# Patient Record
Sex: FEMALE | Race: ASIAN | NOT HISPANIC OR LATINO | Employment: UNEMPLOYED | URBAN - METROPOLITAN AREA
[De-identification: names, ages, dates, MRNs, and addresses within clinical notes are randomized per-mention and may not be internally consistent; named-entity substitution may affect disease eponyms.]

---

## 2024-05-22 ENCOUNTER — TRANSFERRED RECORDS (OUTPATIENT)
Dept: HEALTH INFORMATION MANAGEMENT | Facility: CLINIC | Age: 29
End: 2024-05-22

## 2024-05-22 ENCOUNTER — HOSPITAL ENCOUNTER (INPATIENT)
Facility: CLINIC | Age: 29
LOS: 3 days | Discharge: HOME HEALTH | End: 2024-05-25
Attending: OBSTETRICS & GYNECOLOGY | Admitting: OBSTETRICS & GYNECOLOGY
Payer: MEDICAID

## 2024-05-22 DIAGNOSIS — Z30.9 ENCOUNTER FOR CONTRACEPTIVE MANAGEMENT, UNSPECIFIED TYPE: ICD-10-CM

## 2024-05-22 DIAGNOSIS — O36.5990 FETAL GROWTH RESTRICTION ANTEPARTUM: Primary | ICD-10-CM

## 2024-05-22 LAB
ABO/RH(D): NORMAL
ANTIBODY SCREEN: NEGATIVE
BASOPHILS # BLD AUTO: 0 10E3/UL (ref 0–0.2)
BASOPHILS NFR BLD AUTO: 0 %
EOSINOPHIL # BLD AUTO: 0.2 10E3/UL (ref 0–0.7)
EOSINOPHIL NFR BLD AUTO: 4 %
ERYTHROCYTE [DISTWIDTH] IN BLOOD BY AUTOMATED COUNT: 13.9 % (ref 10–15)
ERYTHROCYTE [DISTWIDTH] IN BLOOD BY AUTOMATED COUNT: 14 % (ref 10–15)
HCT VFR BLD AUTO: 32.2 % (ref 35–47)
HCT VFR BLD AUTO: 32.3 % (ref 35–47)
HGB BLD-MCNC: 10.3 G/DL (ref 11.7–15.7)
HGB BLD-MCNC: 10.3 G/DL (ref 11.7–15.7)
HOLD SPECIMEN: NORMAL
IMM GRANULOCYTES # BLD: 0 10E3/UL
IMM GRANULOCYTES NFR BLD: 0 %
LYMPHOCYTES # BLD AUTO: 1.7 10E3/UL (ref 0.8–5.3)
LYMPHOCYTES NFR BLD AUTO: 26 %
MCH RBC QN AUTO: 26.5 PG (ref 26.5–33)
MCH RBC QN AUTO: 26.5 PG (ref 26.5–33)
MCHC RBC AUTO-ENTMCNC: 31.9 G/DL (ref 31.5–36.5)
MCHC RBC AUTO-ENTMCNC: 32 G/DL (ref 31.5–36.5)
MCV RBC AUTO: 83 FL (ref 78–100)
MCV RBC AUTO: 83 FL (ref 78–100)
MONOCYTES # BLD AUTO: 0.6 10E3/UL (ref 0–1.3)
MONOCYTES NFR BLD AUTO: 10 %
NEUTROPHILS # BLD AUTO: 4 10E3/UL (ref 1.6–8.3)
NEUTROPHILS NFR BLD AUTO: 60 %
NRBC # BLD AUTO: 0 10E3/UL
NRBC BLD AUTO-RTO: 0 /100
PLATELET # BLD AUTO: 223 10E3/UL (ref 150–450)
PLATELET # BLD AUTO: 223 10E3/UL (ref 150–450)
RBC # BLD AUTO: 3.88 10E6/UL (ref 3.8–5.2)
RBC # BLD AUTO: 3.88 10E6/UL (ref 3.8–5.2)
SPECIMEN EXPIRATION DATE: NORMAL
T PALLIDUM AB SER QL: NONREACTIVE
WBC # BLD AUTO: 6.7 10E3/UL (ref 4–11)
WBC # BLD AUTO: 7.1 10E3/UL (ref 4–11)

## 2024-05-22 PROCEDURE — 250N000009 HC RX 250: Performed by: STUDENT IN AN ORGANIZED HEALTH CARE EDUCATION/TRAINING PROGRAM

## 2024-05-22 PROCEDURE — 36415 COLL VENOUS BLD VENIPUNCTURE: CPT | Performed by: STUDENT IN AN ORGANIZED HEALTH CARE EDUCATION/TRAINING PROGRAM

## 2024-05-22 PROCEDURE — 86780 TREPONEMA PALLIDUM: CPT | Performed by: OBSTETRICS & GYNECOLOGY

## 2024-05-22 PROCEDURE — 85027 COMPLETE CBC AUTOMATED: CPT | Performed by: STUDENT IN AN ORGANIZED HEALTH CARE EDUCATION/TRAINING PROGRAM

## 2024-05-22 PROCEDURE — 258N000003 HC RX IP 258 OP 636: Performed by: STUDENT IN AN ORGANIZED HEALTH CARE EDUCATION/TRAINING PROGRAM

## 2024-05-22 PROCEDURE — 250N000013 HC RX MED GY IP 250 OP 250 PS 637: Performed by: STUDENT IN AN ORGANIZED HEALTH CARE EDUCATION/TRAINING PROGRAM

## 2024-05-22 PROCEDURE — 86780 TREPONEMA PALLIDUM: CPT | Performed by: STUDENT IN AN ORGANIZED HEALTH CARE EDUCATION/TRAINING PROGRAM

## 2024-05-22 PROCEDURE — 85048 AUTOMATED LEUKOCYTE COUNT: CPT | Performed by: OBSTETRICS & GYNECOLOGY

## 2024-05-22 PROCEDURE — 250N000013 HC RX MED GY IP 250 OP 250 PS 637

## 2024-05-22 PROCEDURE — 76815 OB US LIMITED FETUS(S): CPT | Mod: 26 | Performed by: OBSTETRICS & GYNECOLOGY

## 2024-05-22 PROCEDURE — 86900 BLOOD TYPING SEROLOGIC ABO: CPT | Performed by: OBSTETRICS & GYNECOLOGY

## 2024-05-22 PROCEDURE — 99222 1ST HOSP IP/OBS MODERATE 55: CPT | Mod: 25 | Performed by: OBSTETRICS & GYNECOLOGY

## 2024-05-22 PROCEDURE — 87653 STREP B DNA AMP PROBE: CPT | Performed by: OBSTETRICS & GYNECOLOGY

## 2024-05-22 PROCEDURE — 120N000002 HC R&B MED SURG/OB UMMC

## 2024-05-22 PROCEDURE — 59025 FETAL NON-STRESS TEST: CPT | Mod: 26 | Performed by: OBSTETRICS & GYNECOLOGY

## 2024-05-22 PROCEDURE — 3E0P7VZ INTRODUCTION OF HORMONE INTO FEMALE REPRODUCTIVE, VIA NATURAL OR ARTIFICIAL OPENING: ICD-10-PCS | Performed by: OBSTETRICS & GYNECOLOGY

## 2024-05-22 RX ORDER — TRANEXAMIC ACID 10 MG/ML
1 INJECTION, SOLUTION INTRAVENOUS EVERY 30 MIN PRN
Status: DISCONTINUED | OUTPATIENT
Start: 2024-05-22 | End: 2024-05-24 | Stop reason: HOSPADM

## 2024-05-22 RX ORDER — OXYTOCIN/0.9 % SODIUM CHLORIDE 30/500 ML
100-340 PLASTIC BAG, INJECTION (ML) INTRAVENOUS CONTINUOUS PRN
Status: DISCONTINUED | OUTPATIENT
Start: 2024-05-22 | End: 2024-05-25 | Stop reason: HOSPADM

## 2024-05-22 RX ORDER — MISOPROSTOL 200 UG/1
400 TABLET ORAL
Status: DISCONTINUED | OUTPATIENT
Start: 2024-05-22 | End: 2024-05-24 | Stop reason: HOSPADM

## 2024-05-22 RX ORDER — NALOXONE HYDROCHLORIDE 0.4 MG/ML
0.4 INJECTION, SOLUTION INTRAMUSCULAR; INTRAVENOUS; SUBCUTANEOUS
Status: DISCONTINUED | OUTPATIENT
Start: 2024-05-22 | End: 2024-05-24 | Stop reason: HOSPADM

## 2024-05-22 RX ORDER — LOPERAMIDE HCL 2 MG
2 CAPSULE ORAL
Status: DISCONTINUED | OUTPATIENT
Start: 2024-05-22 | End: 2024-05-24 | Stop reason: HOSPADM

## 2024-05-22 RX ORDER — ACETAMINOPHEN 325 MG/1
650 TABLET ORAL EVERY 4 HOURS PRN
Status: DISCONTINUED | OUTPATIENT
Start: 2024-05-22 | End: 2024-05-22 | Stop reason: HOSPADM

## 2024-05-22 RX ORDER — ONDANSETRON 2 MG/ML
4 INJECTION INTRAMUSCULAR; INTRAVENOUS EVERY 6 HOURS PRN
Status: DISCONTINUED | OUTPATIENT
Start: 2024-05-22 | End: 2024-05-22 | Stop reason: HOSPADM

## 2024-05-22 RX ORDER — LOPERAMIDE HCL 2 MG
4 CAPSULE ORAL
Status: DISCONTINUED | OUTPATIENT
Start: 2024-05-22 | End: 2024-05-24 | Stop reason: HOSPADM

## 2024-05-22 RX ORDER — KETOROLAC TROMETHAMINE 30 MG/ML
30 INJECTION, SOLUTION INTRAMUSCULAR; INTRAVENOUS
Status: DISCONTINUED | OUTPATIENT
Start: 2024-05-22 | End: 2024-05-25 | Stop reason: HOSPADM

## 2024-05-22 RX ORDER — OXYTOCIN 10 [USP'U]/ML
10 INJECTION, SOLUTION INTRAMUSCULAR; INTRAVENOUS
Status: DISCONTINUED | OUTPATIENT
Start: 2024-05-22 | End: 2024-05-24 | Stop reason: HOSPADM

## 2024-05-22 RX ORDER — OXYTOCIN/0.9 % SODIUM CHLORIDE 30/500 ML
1-24 PLASTIC BAG, INJECTION (ML) INTRAVENOUS CONTINUOUS
Status: DISCONTINUED | OUTPATIENT
Start: 2024-05-22 | End: 2024-05-23

## 2024-05-22 RX ORDER — METOCLOPRAMIDE 10 MG/1
10 TABLET ORAL EVERY 6 HOURS PRN
Status: DISCONTINUED | OUTPATIENT
Start: 2024-05-22 | End: 2024-05-24 | Stop reason: HOSPADM

## 2024-05-22 RX ORDER — PROCHLORPERAZINE 25 MG
25 SUPPOSITORY, RECTAL RECTAL EVERY 12 HOURS PRN
Status: DISCONTINUED | OUTPATIENT
Start: 2024-05-22 | End: 2024-05-24 | Stop reason: HOSPADM

## 2024-05-22 RX ORDER — CARBOPROST TROMETHAMINE 250 UG/ML
250 INJECTION, SOLUTION INTRAMUSCULAR
Status: DISCONTINUED | OUTPATIENT
Start: 2024-05-22 | End: 2024-05-24 | Stop reason: HOSPADM

## 2024-05-22 RX ORDER — METHYLERGONOVINE MALEATE 0.2 MG/ML
200 INJECTION INTRAVENOUS
Status: DISCONTINUED | OUTPATIENT
Start: 2024-05-22 | End: 2024-05-24 | Stop reason: HOSPADM

## 2024-05-22 RX ORDER — PROCHLORPERAZINE 25 MG
25 SUPPOSITORY, RECTAL RECTAL EVERY 12 HOURS PRN
Status: DISCONTINUED | OUTPATIENT
Start: 2024-05-22 | End: 2024-05-22 | Stop reason: HOSPADM

## 2024-05-22 RX ORDER — ONDANSETRON 4 MG/1
4 TABLET, ORALLY DISINTEGRATING ORAL EVERY 6 HOURS PRN
Status: DISCONTINUED | OUTPATIENT
Start: 2024-05-22 | End: 2024-05-24 | Stop reason: HOSPADM

## 2024-05-22 RX ORDER — MISOPROSTOL 100 UG/1
25 TABLET ORAL EVERY 4 HOURS PRN
Status: DISCONTINUED | OUTPATIENT
Start: 2024-05-22 | End: 2024-05-23

## 2024-05-22 RX ORDER — MISOPROSTOL 200 UG/1
800 TABLET ORAL
Status: DISCONTINUED | OUTPATIENT
Start: 2024-05-22 | End: 2024-05-24 | Stop reason: HOSPADM

## 2024-05-22 RX ORDER — ONDANSETRON 2 MG/ML
4 INJECTION INTRAMUSCULAR; INTRAVENOUS EVERY 6 HOURS PRN
Status: DISCONTINUED | OUTPATIENT
Start: 2024-05-22 | End: 2024-05-24 | Stop reason: HOSPADM

## 2024-05-22 RX ORDER — VITAMIN A ACETATE, BETA CAROTENE, ASCORBIC ACID, CHOLECALCIFEROL, .ALPHA.-TOCOPHEROL ACETATE, DL-, THIAMINE MONONITRATE, RIBOFLAVIN, NIACINAMIDE, PYRIDOXINE HYDROCHLORIDE, FOLIC ACID, CYANOCOBALAMIN, CALCIUM CARBONATE, FERROUS FUMARATE, ZINC OXIDE, CUPRIC OXIDE 3080; 12; 120; 400; 1; 1.84; 3; 20; 22; 920; 25; 200; 27; 10; 2 [IU]/1; UG/1; MG/1; [IU]/1; MG/1; MG/1; MG/1; MG/1; MG/1; [IU]/1; MG/1; MG/1; MG/1; MG/1; MG/1
1 TABLET, FILM COATED ORAL DAILY
COMMUNITY
Start: 2024-01-12

## 2024-05-22 RX ORDER — NALOXONE HYDROCHLORIDE 0.4 MG/ML
0.2 INJECTION, SOLUTION INTRAMUSCULAR; INTRAVENOUS; SUBCUTANEOUS
Status: DISCONTINUED | OUTPATIENT
Start: 2024-05-22 | End: 2024-05-24 | Stop reason: HOSPADM

## 2024-05-22 RX ORDER — METOCLOPRAMIDE HYDROCHLORIDE 5 MG/ML
10 INJECTION INTRAMUSCULAR; INTRAVENOUS EVERY 6 HOURS PRN
Status: DISCONTINUED | OUTPATIENT
Start: 2024-05-22 | End: 2024-05-24 | Stop reason: HOSPADM

## 2024-05-22 RX ORDER — PROCHLORPERAZINE MALEATE 10 MG
10 TABLET ORAL EVERY 6 HOURS PRN
Status: DISCONTINUED | OUTPATIENT
Start: 2024-05-22 | End: 2024-05-24 | Stop reason: HOSPADM

## 2024-05-22 RX ORDER — FAMOTIDINE 20 MG/1
20 TABLET, FILM COATED ORAL
COMMUNITY
Start: 2024-05-01

## 2024-05-22 RX ORDER — PENICILLIN G 3000000 [IU]/50ML
3 INJECTION, SOLUTION INTRAVENOUS EVERY 4 HOURS
Status: DISCONTINUED | OUTPATIENT
Start: 2024-05-22 | End: 2024-05-23

## 2024-05-22 RX ORDER — PROCHLORPERAZINE MALEATE 10 MG
10 TABLET ORAL EVERY 6 HOURS PRN
Status: DISCONTINUED | OUTPATIENT
Start: 2024-05-22 | End: 2024-05-22 | Stop reason: HOSPADM

## 2024-05-22 RX ORDER — METOCLOPRAMIDE 10 MG/1
10 TABLET ORAL EVERY 6 HOURS PRN
Status: DISCONTINUED | OUTPATIENT
Start: 2024-05-22 | End: 2024-05-22 | Stop reason: HOSPADM

## 2024-05-22 RX ORDER — OXYTOCIN 10 [USP'U]/ML
10 INJECTION, SOLUTION INTRAMUSCULAR; INTRAVENOUS
Status: DISCONTINUED | OUTPATIENT
Start: 2024-05-22 | End: 2024-05-25 | Stop reason: HOSPADM

## 2024-05-22 RX ORDER — SODIUM CHLORIDE, SODIUM LACTATE, POTASSIUM CHLORIDE, CALCIUM CHLORIDE 600; 310; 30; 20 MG/100ML; MG/100ML; MG/100ML; MG/100ML
INJECTION, SOLUTION INTRAVENOUS CONTINUOUS
Status: DISCONTINUED | OUTPATIENT
Start: 2024-05-22 | End: 2024-05-23

## 2024-05-22 RX ORDER — IBUPROFEN 800 MG/1
800 TABLET, FILM COATED ORAL
Status: DISCONTINUED | OUTPATIENT
Start: 2024-05-22 | End: 2024-05-25 | Stop reason: HOSPADM

## 2024-05-22 RX ORDER — FENTANYL CITRATE 50 UG/ML
100 INJECTION, SOLUTION INTRAMUSCULAR; INTRAVENOUS
Status: DISCONTINUED | OUTPATIENT
Start: 2024-05-22 | End: 2024-05-24 | Stop reason: HOSPADM

## 2024-05-22 RX ORDER — SODIUM CHLORIDE, SODIUM LACTATE, POTASSIUM CHLORIDE, CALCIUM CHLORIDE 600; 310; 30; 20 MG/100ML; MG/100ML; MG/100ML; MG/100ML
INJECTION, SOLUTION INTRAVENOUS CONTINUOUS
Status: DISCONTINUED | OUTPATIENT
Start: 2024-05-22 | End: 2024-05-24 | Stop reason: HOSPADM

## 2024-05-22 RX ORDER — FAMOTIDINE 20 MG/1
20 TABLET, FILM COATED ORAL DAILY
Status: DISCONTINUED | OUTPATIENT
Start: 2024-05-22 | End: 2024-05-25 | Stop reason: HOSPADM

## 2024-05-22 RX ORDER — PENICILLIN G POTASSIUM 5000000 [IU]/1
5 INJECTION, POWDER, FOR SOLUTION INTRAMUSCULAR; INTRAVENOUS ONCE
Status: COMPLETED | OUTPATIENT
Start: 2024-05-22 | End: 2024-05-22

## 2024-05-22 RX ORDER — LIDOCAINE 40 MG/G
CREAM TOPICAL
Status: DISCONTINUED | OUTPATIENT
Start: 2024-05-22 | End: 2024-05-22 | Stop reason: HOSPADM

## 2024-05-22 RX ORDER — CITRIC ACID/SODIUM CITRATE 334-500MG
30 SOLUTION, ORAL ORAL ONCE
Status: COMPLETED | OUTPATIENT
Start: 2024-05-22 | End: 2024-05-22

## 2024-05-22 RX ORDER — SODIUM CHLORIDE, SODIUM LACTATE, POTASSIUM CHLORIDE, CALCIUM CHLORIDE 600; 310; 30; 20 MG/100ML; MG/100ML; MG/100ML; MG/100ML
INJECTION, SOLUTION INTRAVENOUS CONTINUOUS
Status: DISCONTINUED | OUTPATIENT
Start: 2024-05-22 | End: 2024-05-22 | Stop reason: HOSPADM

## 2024-05-22 RX ORDER — OXYTOCIN/0.9 % SODIUM CHLORIDE 30/500 ML
340 PLASTIC BAG, INJECTION (ML) INTRAVENOUS CONTINUOUS PRN
Status: DISCONTINUED | OUTPATIENT
Start: 2024-05-22 | End: 2024-05-24 | Stop reason: HOSPADM

## 2024-05-22 RX ORDER — ONDANSETRON 4 MG/1
4 TABLET, ORALLY DISINTEGRATING ORAL EVERY 6 HOURS PRN
Status: DISCONTINUED | OUTPATIENT
Start: 2024-05-22 | End: 2024-05-22 | Stop reason: HOSPADM

## 2024-05-22 RX ORDER — CITRIC ACID/SODIUM CITRATE 334-500MG
30 SOLUTION, ORAL ORAL
Status: DISCONTINUED | OUTPATIENT
Start: 2024-05-22 | End: 2024-05-24 | Stop reason: HOSPADM

## 2024-05-22 RX ORDER — METOCLOPRAMIDE HYDROCHLORIDE 5 MG/ML
10 INJECTION INTRAMUSCULAR; INTRAVENOUS EVERY 6 HOURS PRN
Status: DISCONTINUED | OUTPATIENT
Start: 2024-05-22 | End: 2024-05-22 | Stop reason: HOSPADM

## 2024-05-22 RX ADMIN — FAMOTIDINE 20 MG: 20 TABLET ORAL at 18:10

## 2024-05-22 RX ADMIN — Medication 1 MILLI-UNITS/MIN: at 17:48

## 2024-05-22 RX ADMIN — SODIUM CHLORIDE, POTASSIUM CHLORIDE, SODIUM LACTATE AND CALCIUM CHLORIDE: 600; 310; 30; 20 INJECTION, SOLUTION INTRAVENOUS at 17:48

## 2024-05-22 RX ADMIN — MISOPROSTOL 25 MCG: 100 TABLET ORAL at 21:49

## 2024-05-22 ASSESSMENT — ACTIVITIES OF DAILY LIVING (ADL)
ADLS_ACUITY_SCORE: 35
ADLS_ACUITY_SCORE: 18

## 2024-05-22 NOTE — PLAN OF CARE
Goal Outcome Evaluation:     Assumed care of pt at 1515. Provider at bedside discussing plan for delivery. Plan to collect GBS and perform CST. CST started at 1755 pitocin stopped at 1900. Vag miso started at 2150. Report given to Jonathan TY RN

## 2024-05-22 NOTE — PLAN OF CARE
Data: Patient admitted to room 481 at 1350. Patient is a . Prenatal record reviewed.   OB History    Para Term  AB Living   2 1 1 0 0 1   SAB IAB Ectopic Multiple Live Births   0 0 0 0 1      # Outcome Date GA Lbr Bal/2nd Weight Sex Type Anes PTL Lv   2 Current            1 Term  39w6d   M    CORNELIUS   .  Medical History: History reviewed. No pertinent past medical history..  Gestational age 37w2d. Vital signs per doc flowsheet. Fetal movement present. Patient reports Induction Of Labor   as reason for admission. Support persons spouse present.  Action: RN obtained and assumed care of patient at admission. Verbal consent for EFM, external fetal monitors applied. Admission assessment completed. Patient and support persons educated on labor process. Patient instructed to report change in fetal movement, contractions, vaginal leaking of fluid or bleeding, abdominal pain, or any concerns related to the pregnancy to her nurse/physician. Patient oriented to room, call light in reach.   Response: Dr. Romero informed of patient's arrival. Plan per provider is to admit for delivery, mode pending.. Patient verbalized understanding of education and verbalized agreement with plan. Patient coping with labor via pending.  Hand off report given to Jacqueline Oliveira RN at 1098

## 2024-05-23 ENCOUNTER — OFFICE VISIT (OUTPATIENT)
Dept: INTERPRETER SERVICES | Facility: CLINIC | Age: 29
End: 2024-05-23
Payer: MEDICAID

## 2024-05-23 LAB
GP B STREP DNA SPEC QL NAA+PROBE: POSITIVE
T PALLIDUM AB SER QL: NONREACTIVE

## 2024-05-23 PROCEDURE — 59200 INSERT CERVICAL DILATOR: CPT | Mod: GC | Performed by: OBSTETRICS & GYNECOLOGY

## 2024-05-23 PROCEDURE — 59409 OBSTETRICAL CARE: CPT | Mod: GC | Performed by: OBSTETRICS & GYNECOLOGY

## 2024-05-23 PROCEDURE — 999N000157 HC STATISTIC RCP TIME EA 10 MIN

## 2024-05-23 PROCEDURE — 120N000002 HC R&B MED SURG/OB UMMC

## 2024-05-23 PROCEDURE — 250N000013 HC RX MED GY IP 250 OP 250 PS 637

## 2024-05-23 PROCEDURE — 250N000011 HC RX IP 250 OP 636

## 2024-05-23 PROCEDURE — 250N000009 HC RX 250: Performed by: STUDENT IN AN ORGANIZED HEALTH CARE EDUCATION/TRAINING PROGRAM

## 2024-05-23 PROCEDURE — 250N000009 HC RX 250

## 2024-05-23 PROCEDURE — 0HQ9XZZ REPAIR PERINEUM SKIN, EXTERNAL APPROACH: ICD-10-PCS | Performed by: OBSTETRICS & GYNECOLOGY

## 2024-05-23 PROCEDURE — 250N000013 HC RX MED GY IP 250 OP 250 PS 637: Performed by: STUDENT IN AN ORGANIZED HEALTH CARE EDUCATION/TRAINING PROGRAM

## 2024-05-23 PROCEDURE — T1013 SIGN LANG/ORAL INTERPRETER: HCPCS | Mod: U4

## 2024-05-23 PROCEDURE — 250N000011 HC RX IP 250 OP 636: Mod: JZ | Performed by: OBSTETRICS & GYNECOLOGY

## 2024-05-23 PROCEDURE — 10907ZC DRAINAGE OF AMNIOTIC FLUID, THERAPEUTIC FROM PRODUCTS OF CONCEPTION, VIA NATURAL OR ARTIFICIAL OPENING: ICD-10-PCS | Performed by: OBSTETRICS & GYNECOLOGY

## 2024-05-23 PROCEDURE — 250N000011 HC RX IP 250 OP 636: Performed by: STUDENT IN AN ORGANIZED HEALTH CARE EDUCATION/TRAINING PROGRAM

## 2024-05-23 PROCEDURE — 722N000001 HC LABOR CARE VAGINAL DELIVERY SINGLE

## 2024-05-23 PROCEDURE — 999N000016 HC STATISTIC ATTENDANCE AT DELIVERY

## 2024-05-23 RX ORDER — SODIUM CHLORIDE, SODIUM LACTATE, POTASSIUM CHLORIDE, CALCIUM CHLORIDE 600; 310; 30; 20 MG/100ML; MG/100ML; MG/100ML; MG/100ML
INJECTION, SOLUTION INTRAVENOUS CONTINUOUS PRN
Status: DISCONTINUED | OUTPATIENT
Start: 2024-05-23 | End: 2024-05-24 | Stop reason: HOSPADM

## 2024-05-23 RX ORDER — HYDROXYZINE HYDROCHLORIDE 50 MG/1
50 TABLET, FILM COATED ORAL EVERY 6 HOURS PRN
Status: DISCONTINUED | OUTPATIENT
Start: 2024-05-23 | End: 2024-05-25 | Stop reason: HOSPADM

## 2024-05-23 RX ORDER — PENICILLIN G 3000000 [IU]/50ML
3 INJECTION, SOLUTION INTRAVENOUS EVERY 4 HOURS
Status: DISCONTINUED | OUTPATIENT
Start: 2024-05-23 | End: 2024-05-24

## 2024-05-23 RX ORDER — PENICILLIN G POTASSIUM 5000000 [IU]/1
5 INJECTION, POWDER, FOR SOLUTION INTRAMUSCULAR; INTRAVENOUS ONCE
Status: COMPLETED | OUTPATIENT
Start: 2024-05-23 | End: 2024-05-23

## 2024-05-23 RX ORDER — ACETAMINOPHEN 325 MG/1
650 TABLET ORAL EVERY 6 HOURS PRN
Status: DISCONTINUED | OUTPATIENT
Start: 2024-05-23 | End: 2024-05-24

## 2024-05-23 RX ORDER — METHYLERGONOVINE MALEATE 0.2 MG/ML
INJECTION INTRAVENOUS
Status: DISCONTINUED
Start: 2024-05-23 | End: 2024-05-24 | Stop reason: WASHOUT

## 2024-05-23 RX ORDER — OXYTOCIN 10 [USP'U]/ML
INJECTION, SOLUTION INTRAMUSCULAR; INTRAVENOUS
Status: DISCONTINUED
Start: 2024-05-23 | End: 2024-05-24 | Stop reason: WASHOUT

## 2024-05-23 RX ORDER — MISOPROSTOL 200 UG/1
TABLET ORAL
Status: DISCONTINUED
Start: 2024-05-23 | End: 2024-05-24 | Stop reason: WASHOUT

## 2024-05-23 RX ORDER — LIDOCAINE HYDROCHLORIDE 10 MG/ML
INJECTION, SOLUTION EPIDURAL; INFILTRATION; INTRACAUDAL; PERINEURAL
Status: DISCONTINUED
Start: 2024-05-23 | End: 2024-05-24 | Stop reason: HOSPADM

## 2024-05-23 RX ORDER — OXYTOCIN/0.9 % SODIUM CHLORIDE 30/500 ML
1-24 PLASTIC BAG, INJECTION (ML) INTRAVENOUS CONTINUOUS
Status: DISCONTINUED | OUTPATIENT
Start: 2024-05-23 | End: 2024-05-24 | Stop reason: HOSPADM

## 2024-05-23 RX ORDER — LIDOCAINE 40 MG/G
CREAM TOPICAL
Status: DISCONTINUED | OUTPATIENT
Start: 2024-05-23 | End: 2024-05-24 | Stop reason: HOSPADM

## 2024-05-23 RX ORDER — HYDROXYZINE HYDROCHLORIDE 25 MG/1
25 TABLET, FILM COATED ORAL EVERY 6 HOURS PRN
Status: DISCONTINUED | OUTPATIENT
Start: 2024-05-23 | End: 2024-05-25 | Stop reason: HOSPADM

## 2024-05-23 RX ADMIN — ACETAMINOPHEN 650 MG: 325 TABLET, FILM COATED ORAL at 15:39

## 2024-05-23 RX ADMIN — FENTANYL CITRATE 100 MCG: 50 INJECTION INTRAMUSCULAR; INTRAVENOUS at 21:46

## 2024-05-23 RX ADMIN — LIDOCAINE HYDROCHLORIDE 20 ML: 10 INJECTION, SOLUTION EPIDURAL; INFILTRATION; INTRACAUDAL; PERINEURAL at 23:25

## 2024-05-23 RX ADMIN — Medication 2 MILLI-UNITS/MIN: at 20:08

## 2024-05-23 RX ADMIN — Medication 340 ML/HR: at 23:21

## 2024-05-23 RX ADMIN — MISOPROSTOL 25 MCG: 100 TABLET ORAL at 06:16

## 2024-05-23 RX ADMIN — PENICILLIN G POTASSIUM 5 MILLION UNITS: 5000000 POWDER, FOR SOLUTION INTRAMUSCULAR; INTRAPLEURAL; INTRATHECAL; INTRAVENOUS at 15:40

## 2024-05-23 RX ADMIN — PENICILLIN G 3 MILLION UNITS: 3000000 INJECTION, SOLUTION INTRAVENOUS at 19:46

## 2024-05-23 RX ADMIN — METOCLOPRAMIDE HYDROCHLORIDE 10 MG: 5 INJECTION INTRAMUSCULAR; INTRAVENOUS at 14:57

## 2024-05-23 RX ADMIN — FAMOTIDINE 20 MG: 20 TABLET ORAL at 08:05

## 2024-05-23 RX ADMIN — MISOPROSTOL 25 MCG: 100 TABLET ORAL at 01:56

## 2024-05-23 RX ADMIN — FENTANYL CITRATE 50 MCG: 50 INJECTION INTRAMUSCULAR; INTRAVENOUS at 10:20

## 2024-05-23 ASSESSMENT — ACTIVITIES OF DAILY LIVING (ADL)
ADLS_ACUITY_SCORE: 18
ADLS_ACUITY_SCORE: 22
ADLS_ACUITY_SCORE: 18
ADLS_ACUITY_SCORE: 22
ADLS_ACUITY_SCORE: 18
ADLS_ACUITY_SCORE: 18
ADLS_ACUITY_SCORE: 22
ADLS_ACUITY_SCORE: 18
ADLS_ACUITY_SCORE: 22
ADLS_ACUITY_SCORE: 18
ADLS_ACUITY_SCORE: 22
ADLS_ACUITY_SCORE: 18
ADLS_ACUITY_SCORE: 22

## 2024-05-23 NOTE — PROGRESS NOTES
"[facility]  Labor Progress Note    S:   Feels some tightening but not yet painful ctx. Ok with SVE and balloon placement.    O:   Patient Vitals for the past 24 hrs:   BP Temp Temp src Resp Height Weight   24 0555 101/54 98.5  F (36.9  C) Oral 16 -- --   24 0205 98/53 98.5  F (36.9  C) Oral 16 -- --   24 2320 111/71 98.1  F (36.7  C) Oral 16 1.549 m (5' 1\") 66.4 kg (146 lb 4.8 oz)   24 1903 111/65 98.5  F (36.9  C) Oral 16 -- --   24 1400 102/66 98.2  F (36.8  C) Oral 16 -- --       SVE: 1-/-2 (1016)> Wilburn w/ 80mL (1020) placed after 50 mcg IV fentanyl given  - exam chaperoned by bedside RN Berkley and OB staff Dr. Khan    FHT: 140 baseline, moderate variability, accels present, no decels  [place]: 5 ctx in 10 minutes        A/P:  Alli Gonzales is a 28 year old  at 37w3d by LMP with FGR, here for IOL in the setting of FGR d/t NR NST.    #Labor:  - SVE: wilburn in place for mechanical ripening  - Pain: Fentanyl in active labor  - GBS unknwon; PCN in active labor  - Plan: balloon in place; if ctx space while it is in, plan more miso; pit> AROM after balloon out prn    # Fetal Growth Restriction   # Non reassuring NST in clinic  - Harsha (): EFW 2578g (11%), AC (8%), UAD wnl   - Continuous monitoring    # Hypoechoic parenchymal area in the brain on outside ultrasound  - No evidence of intracranial lesion on BSUS today  - Recommend  head ultrasound   - Avoid operative vaginal delivery    #Fetal Well Being  - Category I FHT  - Continuous EFM  - Interventions PRN  - EFW 6##  - Vertex    Yessi Blas MD, PGY-3  10:40 AM  [facility] Obstetrics & Gynecology Residency    Appreciate Dr. Blas's note above, patient also seen and examined by me. I was present for placement of the wilburn balloon. I agree with the note above.   Irma Khan MD    "

## 2024-05-23 NOTE — H&P
Antepartum History and Physical   May 22, 2024  Alli Gonzales  [id number]    Interview performed with a phone       HPI: Alli Gonzales is a 28 year old  at 37w2d by LMP with history of FGR who presents to triage from clinic in the setting of nonreassuring fetal heart racing in clinic today.  The patient has a known history of a possible fetal intracerebral lesion as well as FGR and was undergoing a growth ultrasound today when a NST was performed and found to be nonreassuring with decelerations.  A BPP was documented to be 8 out of 10.  Umbilical artery Dopplers were normal at that time.  The recommendation was that the patient present to the hospital for induction of labor secondary to fetal growth restriction decelerations at term with possible intracranial lesion.    Alli states that she is feeling well today.  She denies any leakage of fluid, contractions, vaginal bleeding.  Endorses good fetal movement.  Denies any headaches, chest pain, shortness of breath, fevers, chills, nausea, vomiting, right upper quadrant pain, changes in vision, or worsening lower extremity edema.  She notes that during her previous regnancy she did not have any complications.  She denies any history of hypertension or gestational diabetes.  She denies any history of asthma.  She notes that her last delivery was uncomplicated without any postpartum hemorrhage or shoulder disorder concerns.    Her pregnancy has been complicated by:  - Fetal growth restriction   - Suspected intracranial lesion  - Prominent fetal bowel    OBHX:   OB History    Para Term  AB Living   2 1 1 0 0 1   SAB IAB Ectopic Multiple Live Births   0 0 0 0 1      # Outcome Date GA Lbr Bal/2nd Weight Sex Type Anes PTL Lv   2 Current            1 Term  39w6d   M    CORNELIUS     MedicalHX:   History reviewed. No pertinent past medical history.  Denies any chronic medical conditions    SurgicalHX:   History reviewed. No pertinent surgical history.  Denies  any past surgical history    Medications:   Current Facility-Administered Medications   Medication Dose Route Frequency Provider Last Rate Last Admin    carboprost (HEMABATE) injection 250 mcg  250 mcg Intramuscular Q15 Min PRN Shon Soctt MD        And    loperamide (IMODIUM) capsule 4 mg  4 mg Oral Once PRN Shon Scott MD        famotidine (PEPCID) tablet 20 mg  20 mg Oral Daily Shon Scott MD   20 mg at 05/22/24 1810    fentaNYL (PF) (SUBLIMAZE) injection 100 mcg  100 mcg Intravenous Q1H PRN Shon Scott MD        ketorolac (TORADOL) injection 30 mg  30 mg Intravenous Once PRN Shon Scott MD        Or    ketorolac (TORADOL) injection 30 mg  30 mg Intramuscular Once PRN Shon Scott MD        Or    ibuprofen (ADVIL/MOTRIN) tablet 800 mg  800 mg Oral Once PRN Shon Scott MD        lactated ringers BOLUS 1,000 mL  1,000 mL Intravenous Once PRN Shon Scott MD        Or    lactated ringers BOLUS 500 mL  500 mL Intravenous Once PRN Shon Scott MD        lactated ringers infusion   Intravenous Continuous Shon Scott MD   Held at 05/22/24 1806    lactated ringers infusion   Intravenous Continuous Shon Scott MD 10 mL/hr at 05/22/24 1748 New Bag at 05/22/24 1748    lidocaine 1 % 0.1-20 mL  0.1-20 mL Subcutaneous Once PRN Shon Scott MD        loperamide (IMODIUM) capsule 2 mg  2 mg Oral Q2H PRN Shon Scott MD        methylergonovine (METHERGINE) injection 200 mcg  200 mcg Intramuscular Q2H PRN Shon Scott MD        metoclopramide (REGLAN) injection 10 mg  10 mg Intravenous Q6H PRN Shon Scott MD        Or    metoclopramide (REGLAN) tablet 10 mg  10 mg Oral Q6H PRN Shon Scott MD        misoprostol (CYTOTEC) tablet 400 mcg  400 mcg Oral ONCE PRN REPEAT PER INSTRUCTIONS Shon Scott MD        Or    misoprostol (CYTOTEC) tablet 800 mcg  800 mcg Rectal ONCE PRN REPEAT PER INSTRUCTIONS Shon Scott MD        naloxone (NARCAN) injection 0.2 mg  0.2 mg Intravenous Q2 Min PRN  Shon Scott MD        Or    naloxone (NARCAN) injection 0.4 mg  0.4 mg Intravenous Q2 Min PRN Shon Scott MD        Or    naloxone (NARCAN) injection 0.2 mg  0.2 mg Intramuscular Q2 Min PRN Shon Scott MD        Or    naloxone (NARCAN) injection 0.4 mg  0.4 mg Intramuscular Q2 Min PRN Shon Scott MD        ondansetron (ZOFRAN ODT) ODT tab 4 mg  4 mg Oral Q6H PRN Shon Scott MD        Or    ondansetron (ZOFRAN) injection 4 mg  4 mg Intravenous Q6H PRN Shon Scott MD        oxytocin (PITOCIN) 30 units in 500 mL 0.9% NaCl infusion  100-340 mL/hr Intravenous Continuous PRN Shon Scott MD        oxytocin (PITOCIN) 30 units in 500 mL 0.9% NaCl infusion  340 mL/hr Intravenous Continuous PRN Shon Scott MD        oxytocin (PITOCIN) 30 units in 500 mL 0.9% NaCl infusion  1-24 miguel angel-units/min Intravenous Continuous Shon Scott MD 2 mL/hr at 05/22/24 1808 2 miguel angel-units/min at 05/22/24 1808    oxytocin (PITOCIN) injection 10 Units  10 Units Intramuscular Once PRN Shon Scott MD        oxytocin (PITOCIN) injection 10 Units  10 Units Intramuscular Once PRN Shon Scott MD        prochlorperazine (COMPAZINE) injection 10 mg  10 mg Intravenous Q6H PRN Shon Scott MD        Or    prochlorperazine (COMPAZINE) tablet 10 mg  10 mg Oral Q6H PRN Shon Scott MD        Or    prochlorperazine (COMPAZINE) suppository 25 mg  25 mg Rectal Q12H PRN Shon Scott MD        sodium citrate-citric acid (BICITRA) solution 30 mL  30 mL Oral Once PRN Shon Scott MD        tranexamic acid 1 g in 100 mL NS IV bag (premix)  1 g Intravenous Q30 Min PRN Shon Scott MD           Allergies:  No Known Allergies  Denies any allergies     FamilyHX:  History reviewed. No pertinent family history.    SocialHX:   Social History     Socioeconomic History    Marital status:      Spouse name: None    Number of children: None    Years of education: None    Highest education level: None   Tobacco Use    Smoking  status: Never    Smokeless tobacco: Never   Substance and Sexual Activity    Alcohol use: Not Currently    Drug use: Never    Sexual activity: Yes     Partners: Male     Social Determinants of Health     Financial Resource Strain: High Risk (1/25/2024)    Received from [facility]    Overall Financial Resource Strain (CARDIA)     Difficulty of Paying Living Expenses: Hard   Food Insecurity: Food Insecurity Present (1/25/2024)    Received from [facility]    Hunger Vital Sign     Worried About Running Out of Food in the Last Year: Sometimes true     Ran Out of Food in the Last Year: Sometimes true   Transportation Needs: Unmet Transportation Needs (1/25/2024)    Received from [facility]    PRAPARE - Transportation     Lack of Transportation (Medical): No     Lack of Transportation (Non-Medical): Yes   Physical Activity: Insufficiently Active (1/25/2024)    Received from [facility]    Exercise Vital Sign     Days of Exercise per Week: 2 days     Minutes of Exercise per Session: 30 min   Stress: No Stress Concern Present (1/25/2024)    Received from [facility]    Citizen of [place] of Occupational Health - Occupational Stress Questionnaire     Feeling of Stress : Only a little   Social Connections: Socially Isolated (1/25/2024)    Received from [facility]    Social Connection and Isolation Panel NHANES     Frequency of Communication with Friends and Family: Three times a week     Frequency of Social Gatherings with Friends and Family: Once a week     Attends Mu-ism Services: Never     Active Member of Clubs or Organizations: No     Attends Club or Organization Meetings: Never     Marital Status:    Interpersonal Safety: Not At Risk (1/25/2024)    Received from [facility]    Humiliation, Afraid, Rape, and Kick questionnaire     Fear of Current or Ex-Partner: No     Emotionally Abused: No     Physically Abused: No     Sexually Abused: No   Housing  "Stability: Low Risk  (1/25/2024)    Received from [facility]    Housing Stability     What is your housing situation today?: 3 - I have housing       ROS: 10-point ROS negative except as indicated in HPI.    Physical Exam:  Vitals:    05/22/24 1400   BP: 102/66   Resp: 16   Temp: 98.2  F (36.8  C)   TempSrc: Oral     General: alert, oriented female, resting in bed in NAD  CV: Regular rate and rhythm  Lungs: Breathing comfortably on room air, CTABL  Abdomen:  gravid, non tender, non distended, without rebound or guarding   Ext: Bilateral pedal edema    Presentation: cephalic by BSUS.  No evidence of intracranial lesion on bedside ultrasound    FHT: baseline 140 bpm, mod variability, accelerations, no decelerations  [place]: uterine irritability       Prenatal Labs:      Lab Results   Component Value Date    AS Negative 05/22/2024    HGB 10.3 (L) 05/22/2024       GBS Status:   No results found for: \"GBS\"    No results found for: \"PAP\"    Labs:   Results for orders placed or performed during the hospital encounter of 05/22/24 (from the past 24 hour(s))   CBC with platelets differential    Narrative    The following orders were created for panel order CBC with platelets differential.  Procedure                               Abnormality         Status                     ---------                               -----------         ------                     CBC with platelets and d...[[id number]]  Abnormal            Final result                 Please view results for these tests on the individual orders.   ABO/Rh type and screen    Narrative    The following orders were created for panel order ABO/Rh type and screen.  Procedure                               Abnormality         Status                     ---------                               -----------         ------                     Adult Type and Screen[[id number]]                            Final result                 Please view results for these tests on " the individual orders.   CBC with platelets and differential   Result Value Ref Range    WBC Count 6.7 4.0 - 11.0 10e3/uL    RBC Count 3.88 3.80 - 5.20 10e6/uL    Hemoglobin 10.3 (L) 11.7 - 15.7 g/dL    Hematocrit 32.2 (L) 35.0 - 47.0 %    MCV 83 78 - 100 fL    MCH 26.5 26.5 - 33.0 pg    MCHC 32.0 31.5 - 36.5 g/dL    RDW 13.9 10.0 - 15.0 %    Platelet Count 223 150 - 450 10e3/uL    % Neutrophils 60 %    % Lymphocytes 26 %    % Monocytes 10 %    % Eosinophils 4 %    % Basophils 0 %    % Immature Granulocytes 0 %    NRBCs per 100 WBC 0 <1 /100    Absolute Neutrophils 4.0 1.6 - 8.3 10e3/uL    Absolute Lymphocytes 1.7 0.8 - 5.3 10e3/uL    Absolute Monocytes 0.6 0.0 - 1.3 10e3/uL    Absolute Eosinophils 0.2 0.0 - 0.7 10e3/uL    Absolute Basophils 0.0 0.0 - 0.2 10e3/uL    Absolute Immature Granulocytes 0.0 <=0.4 10e3/uL    Absolute NRBCs 0.0 10e3/uL   Adult Type and Screen   Result Value Ref Range    ABO/RH(D) O POS     Antibody Screen Negative Negative    SPECIMEN EXPIRATION DATE [id number]    Extra Tube    Narrative    The following orders were created for panel order Extra Tube.  Procedure                               Abnormality         Status                     ---------                               -----------         ------                     Extra Green Top (Lithium...[[id number]]                      Final result                 Please view results for these tests on the individual orders.   Extra Green Top (Lithium Heparin) Tube   Result Value Ref Range    Hold Specimen JI        Imaging:   Impression MFM US (5/22)  =========     Measurements are consistent with established dates with an AFSHAN of 06/10/2024 and patient is 37w 2d at today's visit.     The estimated fetal weight is 2578 g (11% percentile) and the abdominal circumference is 309.9 mm (8% percentile down from the 15th%ile).     Fetus is cephalic and placenta is posterior, no previa.     The amniotic fluid is normal with an MVP of 5.6 cm and  good fetal activity is seen.     Please see above anatomy reviewed today. No abnormalities were seen. The intracranial area of concern could not be seen secondary to fetal position (head low   in the pelvis)     BPP 8/10 (-2 NST). Umbilical artery Dopplers are reassuring.      Assessment: Alli Gonzales is a 28 year old  at 37w2d by LMP with history of fetal growth restriction and possible intracranial lesion presenting to triage today in the setting of a nonreassuring nonstress test performed at ultrasound today.  Discussed with the patient that at this point in time she is full-term and that with a growth restricted fetus and the nonreassuring fetal stress test that delivery would be recommended.  We discussed the risk, benefits, alternatives to induction of labor at this time.  In particular we discussed that per documentation from her clinic visit today there are some signs of fetal distress and given the fact that the patient is full-term we expect that her infant would do well once delivered.  In regards to the concern for an intracranial lesion, we discussed that we did not identify this lesion on bedside ultrasound today.  We discussed that we would recommend continuing with an induction of labor and that we believe a vaginal delivery is safe at this time.  Will discuss with the pediatrics team regarding any subsequent imaging that they desire.  We did discuss however that we would not recommend an operative vaginal delivery in the setting of a possible intracranial lesion.  Patient is amenable to induction of labor at this time.    # Induction of Labor  - Admit for IOL in the setting of fetal growth restriction with nonreassuring NST at term  - Cervix: C/L/H  - Plan to start with CST given FGR and nonreassuring NST in clinic   - Consider continuing pitocin or misoprostol if passing CST   - Membranes: Intact  - Labs: CBC, T&S, RPR  - GBS unknown; GBS collected.  Will administer pcn when in active labor and  prior to ROM   - Pain Control: Per patient request; Discussed options    - Desires fentanyl in active labor.   - Diet: Regular in early labor. NPO once on pitocin or in active labor  - PPH Meds: No contraindication     # Fetal Growth Restriction   # Non reassuring NST in clinic  - Harsha (): EFW 2578g (11%), AC (8%), UAD wnl   - Continuous monitoring    # Hypoechoic parenchymal area in the brain on outside ultrasound  - No evidence of intracranial lesion on BSUS today  - Recommend  head ultrasound   - Avoid operative vaginal delivery    # Prominent large bowel on outside ultrasound, although normal measurement of 11 mm reported    # Positive RPR, negative Treponemal Lizzy  - False positive     # PNC  - Rh pos, Rubella equivocal, GCT wnl, GBS unknown  - Other prenatal labs wnl  - Imaging: see imaging tab     # FWB:   Cat I tracing, reactive and reassuring; ceph by BSUS  - Continuous Fetal Monitoring  - NICU for delivery   - Intrauterine resuscitative measures prn    Patient seen and care plan discussed under supervision of Dr. Rosemary Romero     Documentation per Minh Garcia MS4    Resident/Fellow Attestation   I, Shon Scott, was present with the medical student who participated in the service and in the documentation of the note.  I have verified the history and personally performed the physical exam and medical decision making.  I agree with the assessment and plan of care as documented in the note.  I have reviewed the note and made changes as necessary.    Shon Scott MD  OBGYN PGY-4  May 22, 2024 7:24 PM    Physician Attestation   I saw this patient with the resident and agree with the resident/fellow's findings and plan of care as documented in the note.      Key findings: 29 yo  at 37w 2d with pregnancy complicated by fetal growth restriction with equivocal NST in clinic today (two decels noted), possible hypoechoic area in the star and prominent large bowel (11 mm) noted on MFM ultrasound  at [facility]. She was scheduled to have a fetal MRI next week to evaluate the fetal CNS and large bowel. However, the patient was recommended to deliver at a tertiary care facility where Pediatric Subspecialty care was more immediately available today given the results of today's ultrasound.     /65 (BP Location: Right arm, Patient Position: Semi-Rhodes's, Cuff Size: Adult Small)   Temp 98.5  F (36.9  C) (Oral)   Resp 16   LMP 2023     FHT: 140's, moderate variability, accelerations present, no decelerations  [place]: quiet    Bedside ultrasound: The fetal CNS appeared normal. CSP, lateral ventricles, choroid plexus all visualized. No differences were noted on today's bedside ultrasound.    Will plan to proceed with IOL given the FGR with NST with decelerations noted in clinic. Given the ultrasound appeared normal today will plan to proceed with IOL, however, would avoid operative vaginal delivery given concerns raised on previous ultrasounds. Recommend notifying the Pediatric Provider of the recommendation for  head ultrasound to evaluate the CNS.    60 MINUTES SPENT BY ME on the date of service doing chart review, history, exam, documentation & further activities per the note.    I have personally reviewed the following data over the past 24 hrs:    6.7  \   10.3 (L)   / 223     N/A N/A N/A /  N/A   N/A N/A N/A \         Rosemary Romero MD  Date of Service (when I saw the patient): 24

## 2024-05-23 NOTE — PROGRESS NOTES
"[facility]  Labor Progress Note    S:   Comfortable noting some contractions.    O:   Patient Vitals for the past 4 hrs:   BP Temp Temp src Resp Height Weight   24 0205 98/53 98.5  F (36.9  C) Oral 16 -- --   24 2320 111/71 98.1  F (36.7  C) Oral 16 1.549 m (5' 1\") 66.4 kg (146 lb 4.8 oz)       SVE: deferred    A/P:  Doangie Gonzales is a 28 year old  at 37w3d by LMP with FGR, here for IOL in the setting of FGR d/t NR NST.    #Labor:  - SVE: C/L/H  - Pain: Fentanyl in active labor  - GBS unknwon; PCN in active labor  - Plan: Passed CST, now having received 2 vaginal miso. Will do 3 vaginal miso and then assess for balloon placement.    # Fetal Growth Restriction   # Non reassuring NST in clinic  - Harsha (): EFW 2578g (11%), AC (8%), UAD wnl   - Continuous monitoring    # Hypoechoic parenchymal area in the brain on outside ultrasound  - No evidence of intracranial lesion on BSUS today  - Recommend  head ultrasound   - Avoid operative vaginal delivery    #Fetal Well Being  - Category I FHT  - Continuous EFM  - Interventions PRN  - EFW 6##  - Vertex    Jae Dodd MD, MPH  Ob/Gyn Resident, PGY-3  24 2:27 AM    "

## 2024-05-23 NOTE — PROVIDER NOTIFICATION
05/23/24 1227   Provider Notification   Provider Name/Title Dr. Radha Jim   Method of Notification Electronic Page   Request Evaluate - Remote   Notification Reason Uterine Activity     Provider notified of uterine tachysystole and requested for IV bolus order.

## 2024-05-23 NOTE — PROGRESS NOTES
"[facility]  Labor Progress Note    S:   Per RN, wilburn out, patient feeling contractions more.    O:   Patient Vitals for the past 24 hrs:   BP Temp Temp src Resp Height Weight   24 1201 107/64 97.9  F (36.6  C) Oral 16 -- --   24 0832 128/72 -- -- -- -- --   24 0555 101/54 98.5  F (36.9  C) Oral 16 -- --   24 0205 98/53 98.5  F (36.9  C) Oral 16 -- --   24 2320 111/71 98.1  F (36.7  C) Oral 16 1.549 m (5' 1\") 66.4 kg (146 lb 4.8 oz)   24 1903 111/65 98.5  F (36.9  C) Oral 16 -- --       SVE: deferred with balloon recently dislodged    FHT: 140 baseline, moderate variability, accels present, no decels  [place]: 5 ctx in 10 minutes        A/P:  Alli Gonzales is a 28 year old  at 37w3d by LMP with FGR, here for IOL in the setting of FGR d/t NR NST.    #Labor:  - SVE: s/p cervical ripening  - Pain: plans Fentanyl  - GBS unknwon; consider PCN in active labor  - Plan: pit> AROM now ith balloon out prn if ctz space    # Fetal Growth Restriction   # Non reassuring NST in clinic  - Harsha (): EFW 2578g (11%), AC (8%), UAD wnl   - Continuous monitoring    # Hypoechoic parenchymal area in the brain on outside ultrasound  - No evidence of intracranial lesion on BSUS today  - Recommend  head ultrasound   - Avoid operative vaginal delivery    #Fetal Well Being  - Category I FHT  - Continuous EFM  - Interventions PRN   - Discussed IV fluid bolus as needed for any fetal distress w/ borderline tachysystole  - EFW 6#  - Vertex    Yessi Blas MD, PGY-3  2:50 PM  [facility] Obstetrics & Gynecology Residency      "

## 2024-05-23 NOTE — PLAN OF CARE
VSS on room air, afebrile. Denies pre-e symptoms.  Feeling contractions at times, however not particularly painful and not feeling all of them, see flowsheet for frequency. FHT overall moderate with accels and no decels, see flowsheet. Has had third vag miso at 0616, see MAR.  Desk NST attempting to find in person  if possible due to delays finding [facility] interpreters via language services iPad/language services line.

## 2024-05-23 NOTE — PROGRESS NOTES
"[facility]  Labor Progress Note    Visit conducted with the assistance of an in-person [employer] (acquired by [employer] in 2014) .    S:   Patient feeling ok after treatment of nausea and mild headache. Trying to get some rest. Reviewed GBS positive result.    O:   Patient Vitals for the past 24 hrs:   BP Temp Temp src Resp Height Weight   24 1201 107/64 97.9  F (36.6  C) Oral 16 -- --   24 0832 128/72 -- -- -- -- --   24 0555 101/54 98.5  F (36.9  C) Oral 16 -- --   24 0205 98/53 98.5  F (36.9  C) Oral 16 -- --   24 2320 111/71 98.1  F (36.7  C) Oral 16 1.549 m (5' 1\") 66.4 kg (146 lb 4.8 oz)   24 1903 111/65 98.5  F (36.9  C) Oral 16 -- --       SVE: deferred with balloon recently dislodged, will await until time of AROM assessment    FHT: 140 baseline, moderate variability, accels present, no decels  [place]: 4 ctx in 10 minutes        A/P:  Alli Gonzales is a 28 year old  at 37w3d by LMP with FGR, here for IOL in the setting of FGR d/t NR NST.    #Labor:  - SVE: s/p cervical ripening  - Pain: plans Fentanyl  - GBS POSITIVE; start PCN now before AROM  - Plan: pit> AROM now ith balloon out prn if ctz space    # Fetal Growth Restriction   # Non reassuring NST in clinic  - Harsha (): EFW 2578g (11%), AC (8%), UAD wnl   - Continuous monitoring    # Hypoechoic parenchymal area in the brain on outside ultrasound  - No evidence of intracranial lesion on BSUS today  - Recommend  head ultrasound   - Avoid operative vaginal delivery    #Fetal Well Being  - Category I FHT  - Continuous EFM  - Interventions PRN   - Discussed IV fluid bolus as needed for any fetal distress w/ borderline tachysystole  - EFW 6#  - Vertex    Yessi Blas MD, PGY-3  3:32 PM  [facility] Obstetrics & Gynecology Residency        "

## 2024-05-24 LAB — HGB BLD-MCNC: 10.4 G/DL (ref 11.7–15.7)

## 2024-05-24 PROCEDURE — 120N000002 HC R&B MED SURG/OB UMMC

## 2024-05-24 PROCEDURE — 99232 SBSQ HOSP IP/OBS MODERATE 35: CPT | Mod: GC | Performed by: OBSTETRICS & GYNECOLOGY

## 2024-05-24 PROCEDURE — 36415 COLL VENOUS BLD VENIPUNCTURE: CPT

## 2024-05-24 PROCEDURE — 722N000001 HC LABOR CARE VAGINAL DELIVERY SINGLE

## 2024-05-24 PROCEDURE — 250N000013 HC RX MED GY IP 250 OP 250 PS 637

## 2024-05-24 PROCEDURE — 85018 HEMOGLOBIN: CPT

## 2024-05-24 PROCEDURE — 250N000013 HC RX MED GY IP 250 OP 250 PS 637: Performed by: STUDENT IN AN ORGANIZED HEALTH CARE EDUCATION/TRAINING PROGRAM

## 2024-05-24 RX ORDER — MODIFIED LANOLIN
OINTMENT (GRAM) TOPICAL
Status: DISCONTINUED | OUTPATIENT
Start: 2024-05-24 | End: 2024-05-25 | Stop reason: HOSPADM

## 2024-05-24 RX ORDER — LOPERAMIDE HCL 2 MG
2 CAPSULE ORAL
Status: DISCONTINUED | OUTPATIENT
Start: 2024-05-24 | End: 2024-05-25 | Stop reason: HOSPADM

## 2024-05-24 RX ORDER — OXYTOCIN/0.9 % SODIUM CHLORIDE 30/500 ML
340 PLASTIC BAG, INJECTION (ML) INTRAVENOUS CONTINUOUS PRN
Status: DISCONTINUED | OUTPATIENT
Start: 2024-05-24 | End: 2024-05-25 | Stop reason: HOSPADM

## 2024-05-24 RX ORDER — MISOPROSTOL 200 UG/1
400 TABLET ORAL
Status: DISCONTINUED | OUTPATIENT
Start: 2024-05-24 | End: 2024-05-25 | Stop reason: HOSPADM

## 2024-05-24 RX ORDER — MISOPROSTOL 200 UG/1
800 TABLET ORAL
Status: DISCONTINUED | OUTPATIENT
Start: 2024-05-24 | End: 2024-05-25 | Stop reason: HOSPADM

## 2024-05-24 RX ORDER — CARBOPROST TROMETHAMINE 250 UG/ML
250 INJECTION, SOLUTION INTRAMUSCULAR
Status: DISCONTINUED | OUTPATIENT
Start: 2024-05-24 | End: 2024-05-25 | Stop reason: HOSPADM

## 2024-05-24 RX ORDER — LOPERAMIDE HCL 2 MG
4 CAPSULE ORAL
Status: DISCONTINUED | OUTPATIENT
Start: 2024-05-24 | End: 2024-05-25 | Stop reason: HOSPADM

## 2024-05-24 RX ORDER — ACETAMINOPHEN 325 MG/1
650 TABLET ORAL EVERY 4 HOURS PRN
Status: DISCONTINUED | OUTPATIENT
Start: 2024-05-24 | End: 2024-05-25 | Stop reason: HOSPADM

## 2024-05-24 RX ORDER — DOCUSATE SODIUM 100 MG/1
100 CAPSULE, LIQUID FILLED ORAL DAILY
Status: DISCONTINUED | OUTPATIENT
Start: 2024-05-24 | End: 2024-05-25 | Stop reason: HOSPADM

## 2024-05-24 RX ORDER — METHYLERGONOVINE MALEATE 0.2 MG/ML
200 INJECTION INTRAVENOUS
Status: DISCONTINUED | OUTPATIENT
Start: 2024-05-24 | End: 2024-05-25 | Stop reason: HOSPADM

## 2024-05-24 RX ORDER — OXYTOCIN 10 [USP'U]/ML
10 INJECTION, SOLUTION INTRAMUSCULAR; INTRAVENOUS
Status: DISCONTINUED | OUTPATIENT
Start: 2024-05-24 | End: 2024-05-25 | Stop reason: HOSPADM

## 2024-05-24 RX ORDER — BISACODYL 10 MG
10 SUPPOSITORY, RECTAL RECTAL DAILY PRN
Status: DISCONTINUED | OUTPATIENT
Start: 2024-05-24 | End: 2024-05-25 | Stop reason: HOSPADM

## 2024-05-24 RX ORDER — HYDROCORTISONE 25 MG/G
CREAM TOPICAL 3 TIMES DAILY PRN
Status: DISCONTINUED | OUTPATIENT
Start: 2024-05-24 | End: 2024-05-25 | Stop reason: HOSPADM

## 2024-05-24 RX ORDER — TRANEXAMIC ACID 10 MG/ML
1 INJECTION, SOLUTION INTRAVENOUS EVERY 30 MIN PRN
Status: DISCONTINUED | OUTPATIENT
Start: 2024-05-24 | End: 2024-05-25 | Stop reason: HOSPADM

## 2024-05-24 RX ORDER — IBUPROFEN 800 MG/1
800 TABLET, FILM COATED ORAL EVERY 6 HOURS PRN
Status: DISCONTINUED | OUTPATIENT
Start: 2024-05-24 | End: 2024-05-25 | Stop reason: HOSPADM

## 2024-05-24 RX ADMIN — ACETAMINOPHEN 650 MG: 325 TABLET, FILM COATED ORAL at 20:30

## 2024-05-24 RX ADMIN — FAMOTIDINE 20 MG: 20 TABLET ORAL at 08:51

## 2024-05-24 RX ADMIN — DOCUSATE SODIUM 100 MG: 100 CAPSULE, LIQUID FILLED ORAL at 08:51

## 2024-05-24 RX ADMIN — IBUPROFEN 800 MG: 800 TABLET, FILM COATED ORAL at 20:30

## 2024-05-24 RX ADMIN — IBUPROFEN 800 MG: 800 TABLET, FILM COATED ORAL at 13:40

## 2024-05-24 ASSESSMENT — ACTIVITIES OF DAILY LIVING (ADL)
ADLS_ACUITY_SCORE: 18
ADLS_ACUITY_SCORE: 22
ADLS_ACUITY_SCORE: 18
ADLS_ACUITY_SCORE: 22
ADLS_ACUITY_SCORE: 18
ADLS_ACUITY_SCORE: 22
ADLS_ACUITY_SCORE: 18
ADLS_ACUITY_SCORE: 18

## 2024-05-24 NOTE — PLAN OF CARE
Patient ambulating in room, denying dizziness. Voiding well independently. Offered help ordering breakfast through . Patient and  brought own food and declined ordering anything additional. Encouraged them to call if we can help with ordering throughout stay. Plan of care ongoing.

## 2024-05-24 NOTE — PLAN OF CARE
Care assumed from KENNEY Gooden at 1200.    Alli is here for IOL for FGR d/t NR NST. Her wilburn balloon fell off after traction at 1339. IV penicillin started for GBS positive status at 1540. Membranes intact. She has been ling every 2-3 minutes regularly. Plan is to start IV pitocin if her contractions space out. C-EFM, see flowsheet.     VSS. She complained of headache, relieved by oral tylenol. Nausea relieved by IV Reglan. No other s/s of pre-eclampsia. PIV at left lower arm intact and patent.     She is coping with the help of birthing tools, hot packs, aromatherapy and support person Javed () at bedside. Anticipate .     Report given and care transferred to KENNEY Carrero at 1925.

## 2024-05-24 NOTE — PROGRESS NOTES
OB Postpartum Progress Note  PPD#1 s/p     S: Hmdarlene  not available at the time of pre-rounds. No concerns overnight per discussion with RN.    O:  Patient Vitals for the past 24 hrs:   BP Temp Temp src Pulse Resp   24 1100 92/57 98.3  F (36.8  C) -- 70 16   24 0635 95/59 99.1  F (37.3  C) Oral 72 16   24 0210 101/67 99.7  F (37.6  C) Oral 64 --   24 0111 114/55 -- -- -- 16   24 0056 112/56 -- -- -- 16   24 0041 115/64 -- -- -- 18   24 0030 113/63 -- -- -- 18   24 0015 134/77 -- -- -- 18   24 0011 -- -- -- -- 18   24 0000 134/84 -- -- -- 18   24 2345 (!) 141/69 -- -- -- 18   24 2341 (!) 141/69 -- -- -- 18   24 2321 133/58 -- -- -- 18   24 2300 (!) 146/74 -- -- -- 18   24 2258 (!) 153/85 97.6  F (36.4  C) Oral -- 18   24 112/68 98.2  F (36.8  C) Oral -- 16   24 2019 116/73 98.2  F (36.8  C) Oral -- 16   24 1842 104/58 98.4  F (36.9  C) Oral -- 16   24 1507 110/64 98.2  F (36.8  C) Oral -- 16     Per prior exam-  Gen: in NAD  CV: Regular rate, well perfused  Resp: Non-labored breathing on room air  Abd: Soft, non-tender, fundus firm below the umbilicus and non-tender  Ext: No lower extremity edema bilaterally    Recent Results (from the past 24 hour(s))   Hemoglobin    Collection Time: 24  8:08 AM   Result Value Ref Range    Hemoglobin 10.4 (L) 11.7 - 15.7 g/dL         A/P: Alli Gonzales is a 28 year old  who is PPD#1 s/p  following IOL for fetal brain lesion. Stable in the postpartum period.    # Postpartum Care  - Pain: Continue PRN acetaminophen and ibuprofen  - Heme: Appropriate blood loss from delivery. No s/s of anemia. Hgb 10.3> > 10.4  - GI: PRN bowel regimen, anti-emetics  - : Voiding spontaneously   - PNC: Rh positive, Rubella immune.  - Breast feeding; no issues  - Contraception: Discuss prior to discharge  - PPx: Encourage ambulation, IS, SCDs while confined  to bed    # Positive RPR  - Negative treponema    # GERD  - PTA pepcid    Medically Ready for Discharge: Anticipated Tomorrow    Yessi Blas MD, PGY-3  8:57 AM  [facility] Obstetrics & Gynecology Residency    [facility] staff:  Appreciate note by .  I have seen and examined the patient without the resident. I have reviewed, edited, and agree with the note.   Patient had several mild range BPs around time of delivery. She was unmedicated, so HTN may have been related to pain of .  Since delivery, she has remained  normotensive and even running low blood pressures.  She has a stable post partum hemoglobin. Antipate discharge tomorrow.     Aishwarya Jasmine MD, FACOG  2024  1:44 PM

## 2024-05-24 NOTE — PLAN OF CARE
of viable Male with Dr. Dodd in attendance. Infant to mothers abdomen, dried and stimulated by KENNEY Norris. Apgars 6-8-9. NICU/ Nursery RN present. Placenta delivered without complications, pitocin bolused, 1st degree laceration with repair done by Dr. Dodd/Dr. Joiner. Kyung cares provided. Mother and baby in stable condition. Report given to KENNEY Schaefer at 0058.

## 2024-05-24 NOTE — PROVIDER NOTIFICATION
05/23/24 2244   Provider Notification   Provider Name/Title Dr. Dodd   Method of Notification Phone   Request Evaluate in Person   Notification Reason Status Update     Pt feels more pressure on her lower back and is having the urge to push.

## 2024-05-24 NOTE — L&D DELIVERY NOTE
OB Vaginal Delivery Note    Alli Gonzales MRN# [id number]   Age: 28 year old YOB: 1995       L&D Delivery Note:     Alli Gonzales is a 28 year old now  who presented at 37w3d for IOL in the setting of FGR.  Pregnancy was notable for FGR, GERD, Pos RPR, neg trep . Her IOL began with a CST that was negative. She then had a cervical ripening balloon and PV misoprostol. She was augmented with pitocin and AROM.  Labor course was uncomplicated.  She progressed to complete and pushed for about 1 min, after which she had a spontaneous vaginal delivery of a viable male infant in ELEAZAR position.  NO nuchal cord was noted.  Apgars of 6, 8 and 9 with weight of 2637 grams.  The cord was double clamped after 60 seconds and cut.  A cord segment and cord blood were obtained.  30U of IV pitocin was started. The placenta was then delivered using gentle traction and suprapubic pressure.  The uterus was noted to be firm after fundal massage.  The perineum was assessed for lacerations and a first degree laceration were noted.  The laceration was repaired in standard fashion using 3-0 Vicryl suture.  On final examination of the perineum, the repair was noted to be hemostatic.  Total QBL was 150.  The placenta appeared intact with a 3V umbilical cord.  Dr. Joiner was present for the entire procedure.     Jae Dodd MD, MPH  Ob/Gyn Resident, PGY-3  24 1:04 AM    I was present during the delivery and supervised the resident perform the patient's  and perineal repair.    Shima Joiner MD MPH     GA: 37w3d  GP:   Labor Complications: None   EBL:   mL  Delivery QBL: 50 mL  Delivery Type: Vaginal, Spontaneous   ROM to Delivery Time: (Delivered) Hours: 1 Minutes: 41  [place] Weight:     1 Minute 5 Minute 10 Minute   Apgar Totals: 6   8   9     JAE DODD;JONATHON HOWELL;EDWIN CRUZ     Delivery Details:  Alli Gonzales, a 28 year old  female delivered a viable infant with apgars of 6  and 8 . Patient was  fully dilated and pushing after 0  hours 4  minutes in active labor. Delivery was via vaginal, spontaneous  to a sterile field under none  anesthesia. Infant delivered in         position. Anterior and posterior shoulders delivered without difficulty. The cord was clamped, cut twice and   were noted. Cord blood was obtained in routine fashion with the following disposition:  .      Cord complications: none   Placenta delivered at 2024 11:20 PM . Placental disposition was  . Fundal massage performed and fundus found to be firm.     Episiotomy: none    Perineum, vagina, cervix were inspected, and the following lacerations were noted:   Perineal lacerations: none                Any lacerations were repaired in the usual fashion using 3-0 Vicryl.    Excellent hemostasis was noted. Needle count correct. Infant and patient in delivery room in good and stable condition.        Kelby GonzalesLenAlli [[id number]]      Labor Event Times      Active labor onset date: 24 Onset time: 11:11 PM   Dilation complete date: 24 Complete time: 11:15 PM   Start pushing date/time: 2024 2315          Labor Length      1st Stage (hrs): 0 (min): 4   2nd Stage (hrs): 0 (min): 2   3rd Stage (hrs): 0 (min): 3          Labor Events     labor?: No  Labor Type: Spontaneous  Predominate monitoring during 1st stage: continuous electronic fetal monitoring     Antibiotics received during labor?: Yes  Reason for Antibiotics: GBS  Antibiotics received for GBS: Penicillin  Antibiotics Given (GBS): Greater than 4 hours prior to delivery     Rupture identifier: Sac 1  Rupture date/time: 246   Rupture type: Artificial Rupture of Membranes  Fluid color: Clear  Fluid odor: Normal     Induction: Misoprostol, Mechanical ripening agent, Oxytocin  Mechanical ripening occurred: In hospital  Induction date/time:      Cervical ripening date/time:      Indications for induction: Fetal Indications or Congenital Malformations (Comment to  specify)     Augmentation: Oxytocin, AROM       Delivery/Placenta Date and Time      Delivery Date: 24 Delivery Time: 11:17 PM   Placenta Date/Time: 2024 11:20 PM  Oxytocin given at the time of delivery: after delivery of baby  Delivering clinician: Shima Joiner MD   Other personnel present at delivery:  Provider Role   Jae Dodd MD Resident   Alise Petty, RN Delivery Nurse   Jocelyn Dawkins RN Registered Nurse             Vaginal Counts       Initial count performed by 2 team members:  Two Team Members   Jae FISHER         Needles Suture Needles Sponges (RETIRED) Instruments   Initial counts 2  5    Added to count  1     Relief counts       Final counts 2 1 5            Placed during labor Accounted for at the end of labor   FSE No NA   IUPC No NA   Cervidil No NA                  Final count performed by 2 team members:  Two Team Members   Jae FISHER      Final count correct?: Yes  Post-Birth Team Debrief: Complete       Apgars    Living status: Living   1 Minute 5 Minute 10 Minute 15 Minute 20 Minute   Skin color: 1  1  1  1  1    Heart rate: 2  2  2  2  2    Reflex irritability: 1  2  2  2  2    Muscle tone: 1  2  2  2  2    Respiratory effort: 1  1  2  2  2    Total: 6  8  9  9  9    Apgars assigned by: LABOR BRIANA BRAVO 1 MINUTE APGAR/BRYON WREN       Cord      Cord Complications: None               Stem cell collection?: No           [place] Resuscitation    Methods: None   Care at Delivery:     NICU NOTE: Called shortly after delivery due to reported respiratory distress.  Upon arrival, infant pink, but with intermittent apnea.  Initially, infant needed frequent stimulation to continue to cry and breathe.  Vigorous cry when stimulated. Saturations WNL.  After approximately 4 minutes, infant began to consistently breathe without stimulation.  Saturations remained WNL.  Suctioned for a small amount of clear, thick, fluid.  No  further interventions required at this time.  NICU team dismissed. Miranda Silva, NNP, DNP May 23, 2024 11:46 PM           Labor Events and Shoulder Dystocia    Fetal Tracing Prior to Delivery: Category 1       Delivery (Maternal) (Provider to Complete) ([id number])    Episiotomy: None  Perineal lacerations: None    Repair suture: None  Genital tract inspection done: Pos       Blood Loss  Mother: Alli Gonzales #[id number]     Start of Mother's Information      Delivery Blood Loss  05/23/24 2311 - 05/24/24 0010      Delivery QBL (mL) Hospital Encounter 50 mL    Total  50 mL               End of Mother's Information  Mother: Alli Gonzales #[id number]                Delivery - Provider to Complete ([id number])    Delivering clinician: Shima Joiner MD  Delivery Type (Choose the 1 that will go to the Birth History): Vaginal, Spontaneous                         Other personnel:  Provider Role   Jae Dodd MD Resident   Alise Petty, RN Delivery Nurse   Jocelyn Dawkins RN Registered Nurse                    Placenta    Date/Time: 5/23/2024 11:20 PM  Removal: Spontaneous             Anesthesia    Method: None                           Jae Dodd MD

## 2024-05-24 NOTE — PLAN OF CARE
Goal Outcome Evaluation:      Plan of Care Reviewed With: patient    Overall Patient Progress: improvingOverall Patient Progress: improving       Data: VSS, postpartum assessments WNL. She is voiding without difficulty, up ad mayra, eating and drinking without nausea. Perineum appears to be healing well, lochia WNL, temp of 99 on postpartum-provider aware.Pt complains of lower right side pain prior to delivery. Breastfeeding infant and supplementing with formula. Declined any pain meds during shift, pt reports good pain management.   Action: Education provided on discharge goals, plan of care, pain management, and breast feeding latch.  Response: Pt is agreeable with her plan of care. Pt is independent providing  cares and is attentive to infant needs. Support person present. Anticipate discharge tomorrow. Plan of care ongoing, no concerns as of present.

## 2024-05-24 NOTE — PROVIDER NOTIFICATION
05/23/24 7359   Provider Notification   Provider Name/Title Dr. Dodd   Method of Notification Phone   Request Evaluate in Person   Notification Reason Status Update     Pt feels like having to poop.

## 2024-05-24 NOTE — PLAN OF CARE
Data: Alli Gonzales transferred to [facility] room 7144 via wheelchair at 0145. Baby transferred via parent's arms.  Action: Receiving unit notified of transfer: Yes. Patient and family notified of room change. Report given to KENNEY Schaefer at 0058. Belongings sent to receiving unit. Accompanied by Registered Nurse. Call light within reach. ID bands double-checked with receiving RN ([id number]).  Response: Patient tolerated transfer and is stable.

## 2024-05-25 ENCOUNTER — APPOINTMENT (OUTPATIENT)
Dept: INTERPRETER SERVICES | Facility: CLINIC | Age: 29
End: 2024-05-25
Payer: MEDICAID

## 2024-05-25 VITALS
HEART RATE: 72 BPM | WEIGHT: 138.01 LBS | SYSTOLIC BLOOD PRESSURE: 118 MMHG | DIASTOLIC BLOOD PRESSURE: 68 MMHG | HEIGHT: 61 IN | BODY MASS INDEX: 26.06 KG/M2 | RESPIRATION RATE: 16 BRPM | TEMPERATURE: 98 F

## 2024-05-25 PROCEDURE — 99238 HOSP IP/OBS DSCHRG MGMT 30/<: CPT | Mod: GC | Performed by: OBSTETRICS & GYNECOLOGY

## 2024-05-25 PROCEDURE — 250N000013 HC RX MED GY IP 250 OP 250 PS 637: Performed by: STUDENT IN AN ORGANIZED HEALTH CARE EDUCATION/TRAINING PROGRAM

## 2024-05-25 PROCEDURE — 250N000013 HC RX MED GY IP 250 OP 250 PS 637

## 2024-05-25 RX ORDER — AMOXICILLIN 250 MG
1 CAPSULE ORAL DAILY
Qty: 100 TABLET | Refills: 0 | Status: SHIPPED | OUTPATIENT
Start: 2024-05-25

## 2024-05-25 RX ORDER — SENNOSIDES 8.6 MG
8.6 TABLET ORAL 2 TIMES DAILY PRN
Status: DISCONTINUED | OUTPATIENT
Start: 2024-05-25 | End: 2024-05-25 | Stop reason: HOSPADM

## 2024-05-25 RX ORDER — ACETAMINOPHEN 325 MG/1
650 TABLET ORAL EVERY 6 HOURS PRN
Qty: 100 TABLET | Refills: 0 | Status: SHIPPED | OUTPATIENT
Start: 2024-05-25

## 2024-05-25 RX ORDER — ACETAMINOPHEN AND CODEINE PHOSPHATE 120; 12 MG/5ML; MG/5ML
0.35 SOLUTION ORAL DAILY
Qty: 90 TABLET | Refills: 3 | Status: SHIPPED | OUTPATIENT
Start: 2024-05-25

## 2024-05-25 RX ORDER — IBUPROFEN 600 MG/1
600 TABLET, FILM COATED ORAL EVERY 6 HOURS PRN
Qty: 60 TABLET | Refills: 0 | Status: SHIPPED | OUTPATIENT
Start: 2024-05-25

## 2024-05-25 RX ADMIN — FAMOTIDINE 20 MG: 20 TABLET ORAL at 08:57

## 2024-05-25 RX ADMIN — SENNOSIDES 8.6 MG: 8.6 TABLET, FILM COATED ORAL at 11:47

## 2024-05-25 RX ADMIN — DOCUSATE SODIUM 100 MG: 100 CAPSULE, LIQUID FILLED ORAL at 08:57

## 2024-05-25 ASSESSMENT — ACTIVITIES OF DAILY LIVING (ADL)
ADLS_ACUITY_SCORE: 18

## 2024-05-25 NOTE — PLAN OF CARE
Goal Outcome Evaluation: VSS. Patient ambulating free of dizziness. Voiding without difficulty. Pain managed on tylenol and ibuprofen. Working on breastfeeding every 2-3 hours. Attentive to and bonding well with infant. Will continue to monitor.       Plan of Care Reviewed With: patient, spouse    Overall Patient Progress: improvingOverall Patient Progress: improving           Problem: Adult Inpatient Plan of Care  Goal: Optimal Comfort and Wellbeing  Outcome: Progressing  Intervention: Monitor Pain and Promote Comfort  Recent Flowsheet Documentation  Taken 5/24/2024 1345 by Amairani Ratliff RN  Pain Management Interventions: medication (see MAR)  Taken 5/24/2024 1100 by Amairani Ratliff RN  Pain Management Interventions: declines  Intervention: Provide Person-Centered Care  Recent Flowsheet Documentation  Taken 5/24/2024 1100 by Amairani Ratliff RN  Trust Relationship/Rapport:   care explained   choices provided   questions answered   questions encouraged

## 2024-05-25 NOTE — PROVIDER NOTIFICATION
Pt requesting a laxative in addition to colace ordered. Can you order senna or something? Dr. KARSON Miles G1 text paged and updated.

## 2024-05-25 NOTE — PLAN OF CARE
Goal Outcome Evaluation:      Plan of Care Reviewed With: patient    Overall Patient Progress: improving    Vital signs WDL. Postpartum checks: WDL. Pt eating and drinking without issue. Pt ambulating and voiding independently. Pt performing self-cares. Pt medicated for pain during the shift. Breast and formula feeding. Positive attachment behaviors observed with infant. Support person present.

## 2024-05-25 NOTE — DISCHARGE INSTRUCTIONS
Warning Signs after Having a Baby    Keep this paper on your fridge or somewhere else where you can see it.    Call your provider if you have any of these symptoms up to 12 weeks after having your baby.    Thoughts of hurting yourself or your baby  Pain in your chest or trouble breathing  Severe headache not helped by pain medicine  Eyesight concerns (blurry vision, seeing spots or flashes of light, other changes to eyesight)  Fainting, shaking or other signs of a seizure    Call 9-1-1 if you feel that it is an emergency.     The symptoms below can happen to anyone after giving birth. They can be very serious. Call your provider if you have any of these warning signs.    My provider s phone number: _______________________    Losing too much blood (hemorrhage)    Call your provider if you soak through a pad in less than an hour or pass blood clots bigger than a golf ball. These may be signs that you are bleeding too much.    Blood clots in the legs or lungs    After you give birth, your body naturally clots its blood to help prevent blood loss. Sometimes this increased clotting can happen in other areas of the body, like the legs or lungs. This can block your blood flow and be very dangerous.     Call your provider if you:  Have a red, swollen spot on the back of your leg that is warm or painful when you touch it.   Are coughing up blood.     Infection    Call your provider if you have any of these symptoms:  Fever of 100.4 F (38 C) or higher.  Pain or redness around your stitches if you had an incision.   Any yellow, white, or green fluid coming from places where you had stitches or surgery.    Mood Problems (postpartum depression)    Many people feel sad or have mood changes after having a baby. But for some people, these mood swings are worse.     Call your provider right away if you feel so anxious or nervous that you can't care for yourself or your baby.    Preeclampsia (high blood pressure)    Even if you  didn't have high blood pressure when you were pregnant, you are at risk for the high blood pressure disease called preeclampsia. This risk can last up to 12 weeks after giving birth.     Call your provider if you have:   Pain on your right side under your rib cage  Sudden swelling in the hands and face    Remember: You know your body. If something doesn't feel right, get medical help.     For informational purposes only. Not to replace the advice of your health care provider. Copyright 2020 [facility]. All rights reserved. Clinically reviewed by Kaykay Jaeger, RNC-OB, MSN. [employer] 024011 - Rev 02/23.

## 2024-05-25 NOTE — PROGRESS NOTES
OB Postpartum Progress Note  PPD#2 s/p     [employer]  not available at the time of pre-rounds. Patient would like  to translate.     S: Patient is resting comfortably.  Pain is improving and well controlled.  Lochia similar to menses.  Has passed flatus but no BM, is voiding spontaneously.  Ambulating without dizziness/lightheadedness.  Tolerating a regular diet without nausea/vomiting.  Denies fevers/chills, headache, CP, SOB.     O:  Patient Vitals for the past 24 hrs:   BP Temp Temp src Pulse Resp   24 0025 121/81 97.9  F (36.6  C) Oral 69 16   24 2020 107/67 97.8  F (36.6  C) Oral 58 16   24 1540 90/68 98.1  F (36.7  C) -- 62 16   24 1100 92/57 98.3  F (36.8  C) -- 76 16     Exam:  Gen: in NAD, sleeping in bed  CV: Regular rate, well perfused  Resp: Non-labored breathing on room air  Abd: Soft, non-tender, fundus firm below the umbilicus and non-tender  Ext: No lower extremity edema bilaterally    No results found for this or any previous visit (from the past 24 hour(s)).    A/P: Alli Gonzales is a 28 year old  who is PPD#2 s/p  following IOL for fetal brain lesion. Stable in the postpartum period and meeting all postpartum goals. Ready for discharge today    # Postpartum Care  - Pain: Continue PRN acetaminophen and ibuprofen  - Heme: chronic anemia unchanged in PP  - GI: PRN bowel regimen, anti-emetics  - : Voiding spontaneously   - PNC: Rh positive, Rubella immune.  - Breast feeding; no issues  - Contraception: POP on discharge  - PPx: Encourage ambulation, IS, SCDs while confined to bed    # Positive RPR  - Negative treponema    # GERD  - PTA pepcid    Patient discussed with Dr. Adán Miles MD  Obstetrics and Gynecology, PGY-1  24 10:37 AM     I have seen and examined the patient without the resident and with the [employer]  on the phone. I have reviewed, edited, and agree with the note.   My findings are: appears well  Hemoglobin   Date  Value Ref Range Status   05/24/2024 10.4 (L) 11.7 - 15.7 g/dL Final   05/22/2024 10.3 (L) 11.7 - 15.7 g/dL Final     Chronic anemia, stable, asymptomatic.   Dina Mccain MD

## 2024-05-25 NOTE — DISCHARGE SUMMARY
[facility] Discharge Summary    Alli Gonzales MRN# [id number]   Age: 28 year old YOB: 1995     Date of Admission:  2024  Date of Discharge::  24   Admitting Physician:  Rosemary Romero MD  Discharge Physician:  Dina Mccain MD          Admission Diagnoses:   - IUP at 37w3d  - Fetal Growth Restriction  - Hypoechoic parenchymal area in the brain on outside ultrasound  - Positive RPR, negative Treponemal Lizzy           Discharge Diagnosis:     - IUP at 37w3d, now delivered          Procedures:     Procedure(s): -           Medications Prior to Admission:     Medications Prior to Admission   Medication Sig Dispense Refill Last Dose    famotidine (PEPCID) 20 MG tablet Take 20 mg by mouth   Past Week    Prenatal Vit-Fe Fumarate-FA (PRENATAL PLUS) 27-1 MG TABS Take 1 tablet by mouth daily   More than a month             Discharge Medications:        Review of your medicines        START taking        Dose / Directions   acetaminophen 325 MG tablet  Commonly known as: TYLENOL  Used for:  (spontaneous vaginal delivery)      Dose: 650 mg  Take 2 tablets (650 mg) by mouth every 6 hours as needed for mild pain Start after Delivery.  Quantity: 100 tablet  Refills: 0     ibuprofen 600 MG tablet  Commonly known as: ADVIL/MOTRIN  Used for:  (spontaneous vaginal delivery)      Dose: 600 mg  Take 1 tablet (600 mg) by mouth every 6 hours as needed for moderate pain Start after delivery  Quantity: 60 tablet  Refills: 0     norethindrone 0.35 MG tablet  Commonly known as: MICRONOR  Used for: Encounter for contraceptive management, unspecified type      Dose: 0.35 mg  Take 1 tablet (0.35 mg) by mouth daily  Quantity: 90 tablet  Refills: 3     senna-docusate 8.6-50 MG tablet  Commonly known as: SENOKOT-S/PERICOLACE  Used for:  (spontaneous vaginal delivery)      Dose: 1 tablet  Take 1 tablet by mouth daily Start after delivery.  Quantity: 100 tablet  Refills: 0            CONTINUE these  medicines which have NOT CHANGED        Dose / Directions   famotidine 20 MG tablet  Commonly known as: PEPCID      Dose: 20 mg  Take 20 mg by mouth  Refills: 0     Prenatal Plus 27-1 MG Tabs      Dose: 1 tablet  Take 1 tablet by mouth daily  Refills: 0               Where to get your medicines        These medications were sent to [facility] [place], [place] - [address]  [address] [place] [address], [facility] [address]      Phone: [phone]   acetaminophen 325 MG tablet  ibuprofen 600 MG tablet  norethindrone 0.35 MG tablet  senna-docusate 8.6-50 MG tablet             Consultations:   Anesthesia         Brief Admission History and Intrapartum Course   Alli Gonzales is a 28 year old now  who presented at 37w3d for IOL in the setting of FGR.  Pregnancy was notable for FGR, GERD, Pos RPR, neg trep . Her IOL began with a CST that was negative. She then had a cervical ripening balloon and PV misoprostol. She was augmented with pitocin and AROM.  Labor course was uncomplicated.  She progressed to complete and pushed for about 1 min, after which she had a spontaneous vaginal delivery of a viable male infant in ELEAZAR position.  NO nuchal cord was noted.  Apgars of 6, 8 and 9 with weight of 2637 grams.  The cord was double clamped after 60 seconds and cut.  A cord segment and cord blood were obtained.  30U of IV pitocin was started. The placenta was then delivered using gentle traction and suprapubic pressure.  The uterus was noted to be firm after fundal massage.  The perineum was assessed for lacerations and a first degree laceration was noted.  The laceration was repaired in standard fashion using 3-0 Vicryl suture.  On final examination of the perineum, the repair was noted to be hemostatic. Total QBL was 150. The placenta appeared intact with a 3V umbilical cord            Hospital Course:   The patient's hospital course was unremarkable.  On discharge, her pain was well controlled. Vaginal  bleeding is similar to peak menstrual flow.  Voiding without difficulty.  Ambulating well and tolerating a normal diet.  No fever.  Breastfeeding well.  Infant is stable.  No bowel movement yet. She was discharged on post-partum day #2.    Post-partum hemoglobin: 10.4          Discharge Instructions and Follow-Up:     Discharge diet: Regular   Discharge activity: Pelvic rest for 6 weeks including no sexual intercourse, tampons, or douching.    Discharge follow-up: Follow up with your primary OB for a routine postpartum visit in 6 weeks           Discharge Disposition:     Discharged to home in stable condition     The patient discharge plan was discussed with Dr. Adán Miles MD  Obstetrics and Gynecology, PGY-1  05/25/24 1:41 PM     I have seen, examined, and counseled the patient on the day of discharge. I have reviewed and edited the summary.  Dina Mccain

## 2024-05-25 NOTE — PLAN OF CARE
Goal Outcome Evaluation:                 Problem: Postpartum (Vaginal Delivery)  Goal: Optimal Pain Control and Function  Outcome: Met  Intervention: Prevent or Manage Pain  Recent Flowsheet Documentation  Taken 5/25/2024 0032 by Bri Healy RN  Perineal Care: perineum cleansed        VSS. Afebrile. Up ad mayra.  Breast feeding well but baby not as interested in breast.  Supplementing with formula via bottle. Adequate wet and poop diapers. Discharged instructions and meds reviewed and given to pt and FOB. Discharged home.

## 2025-01-15 ENCOUNTER — APPOINTMENT (OUTPATIENT)
Dept: INTERPRETER SERVICES | Facility: CLINIC | Age: 30
End: 2025-01-15
Payer: COMMERCIAL

## 2025-01-15 ENCOUNTER — TRANSFERRED RECORDS (OUTPATIENT)
Dept: HEALTH INFORMATION MANAGEMENT | Facility: CLINIC | Age: 30
End: 2025-01-15
Payer: COMMERCIAL

## 2025-02-05 ENCOUNTER — HOSPITAL ENCOUNTER (OUTPATIENT)
Dept: CARDIOLOGY | Facility: CLINIC | Age: 30
Discharge: HOME | End: 2025-02-05
Attending: STUDENT IN AN ORGANIZED HEALTH CARE EDUCATION/TRAINING PROGRAM
Payer: COMMERCIAL

## 2025-02-05 DIAGNOSIS — I49.1 PAC (PREMATURE ATRIAL CONTRACTION): ICD-10-CM

## 2025-02-05 PROCEDURE — 93325 DOPPLER ECHO COLOR FLOW MAPG: CPT | Mod: 26 | Performed by: PEDIATRICS

## 2025-02-05 PROCEDURE — 76825 ECHO EXAM OF FETAL HEART: CPT | Mod: 26 | Performed by: PEDIATRICS

## 2025-02-05 PROCEDURE — 76827 ECHO EXAM OF FETAL HEART: CPT | Mod: 26 | Performed by: PEDIATRICS

## 2025-02-05 PROCEDURE — 76827 ECHO EXAM OF FETAL HEART: CPT

## 2025-02-05 PROCEDURE — 93325 DOPPLER ECHO COLOR FLOW MAPG: CPT
